# Patient Record
Sex: MALE | Race: WHITE | URBAN - METROPOLITAN AREA
[De-identification: names, ages, dates, MRNs, and addresses within clinical notes are randomized per-mention and may not be internally consistent; named-entity substitution may affect disease eponyms.]

---

## 2017-01-04 ENCOUNTER — HOSPITAL ENCOUNTER (EMERGENCY)
Facility: CLINIC | Age: 37
Discharge: HOME | End: 2017-01-04
Attending: EMERGENCY MEDICINE | Admitting: EMERGENCY MEDICINE
Payer: COMMERCIAL

## 2017-01-04 VITALS
DIASTOLIC BLOOD PRESSURE: 81 MMHG | WEIGHT: 170 LBS | SYSTOLIC BLOOD PRESSURE: 134 MMHG | HEART RATE: 98 BPM | OXYGEN SATURATION: 95 % | BODY MASS INDEX: 25.18 KG/M2 | RESPIRATION RATE: 16 BRPM | TEMPERATURE: 98 F | HEIGHT: 69 IN

## 2017-01-04 DIAGNOSIS — T78.2XXA ANAPHYLAXIS, INITIAL ENCOUNTER: ICD-10-CM

## 2017-01-04 PROCEDURE — 96372 THER/PROPH/DIAG INJ SC/IM: CPT

## 2017-01-04 PROCEDURE — 96374 THER/PROPH/DIAG INJ IV PUSH: CPT

## 2017-01-04 PROCEDURE — S0028 INJECTION, FAMOTIDINE, 20 MG: HCPCS | Performed by: EMERGENCY MEDICINE

## 2017-01-04 PROCEDURE — 96375 TX/PRO/DX INJ NEW DRUG ADDON: CPT

## 2017-01-04 PROCEDURE — 25000125 ZZHC RX 250: Performed by: EMERGENCY MEDICINE

## 2017-01-04 PROCEDURE — 99284 EMERGENCY DEPT VISIT MOD MDM: CPT | Mod: 25

## 2017-01-04 RX ORDER — METHYLPREDNISOLONE SODIUM SUCCINATE 125 MG/2ML
125 INJECTION, POWDER, LYOPHILIZED, FOR SOLUTION INTRAMUSCULAR; INTRAVENOUS ONCE
Status: COMPLETED | OUTPATIENT
Start: 2017-01-04 | End: 2017-01-04

## 2017-01-04 RX ORDER — EPINEPHRINE 0.3 MG/.3ML
0.3 INJECTION SUBCUTANEOUS
Qty: 0.6 ML | Refills: 1 | Status: SHIPPED | OUTPATIENT
Start: 2017-01-04

## 2017-01-04 RX ORDER — DIPHENHYDRAMINE HYDROCHLORIDE 50 MG/ML
INJECTION INTRAMUSCULAR; INTRAVENOUS
Status: DISCONTINUED
Start: 2017-01-04 | End: 2017-01-04 | Stop reason: HOSPADM

## 2017-01-04 RX ORDER — PREDNISONE 20 MG/1
40 TABLET ORAL DAILY
Qty: 10 TABLET | Refills: 0 | Status: SHIPPED | OUTPATIENT
Start: 2017-01-04 | End: 2017-01-09

## 2017-01-04 RX ADMIN — EPINEPHRINE 0.3 MG: 1 INJECTION, SOLUTION INTRAMUSCULAR; SUBCUTANEOUS at 18:30

## 2017-01-04 RX ADMIN — METHYLPREDNISOLONE SODIUM SUCCINATE 125 MG: 125 INJECTION, POWDER, FOR SOLUTION INTRAMUSCULAR; INTRAVENOUS at 18:32

## 2017-01-04 RX ADMIN — FAMOTIDINE 20 MG: 10 INJECTION, SOLUTION INTRAVENOUS at 19:05

## 2017-01-04 ASSESSMENT — ENCOUNTER SYMPTOMS
NAUSEA: 0
ABDOMINAL PAIN: 0

## 2017-01-04 NOTE — ED AVS SNAPSHOT
Emergency Department    [address] [place] [address]    Phone:  [phone]    Fax:  [phone]                                       Jonathan Mcdonald   MRN: [id number]    Department:   Emergency Department   Date of Visit:  1/4/2017           Patient Information     Date Of Birth          1980        Your diagnoses for this visit were:     Anaphylaxis, initial encounter        You were seen by Deandre Lanza MD.      Follow-up Information     Please follow up.    Why:  consider allergy testing, use the Epipen if severe recurrent allergy symptoms - start Prednisone tonight, continue Benadryl and Zantac        Discharge Instructions         Anaphylaxis  Anaphylaxis is the term for a severe allergic reaction. It may begin within minutes, up to a couple hours after exposure to the substance you are allergic to ( allergen ). Symptoms include nausea, vomiting, diarrhea or stomach cramps, itchy rash (hives), swelling of the eyes, lips, face or tongue, wheezing, difficulty breathing or swallowing, throat tightness, chest pain, dizziness or fainting.  This kind of reaction can be life-threatening. Fortunately, your case has responded to treatment. Any remaining symptoms should resolve within 6 to 24 hours.  If you are exposed to the same substance again, you may have the same or more severe reaction. Treatment for anaphylaxis is epinephrine (adrenalin). This is available by prescription as Epi-Pen for self-injection. If the cause of your reaction is known, you should avoid exposure in the future. If the cause is not known, follow up with your doctor for special testing to determine what you are allergic to.  Home care    Rest at home for the next 24 hours.    Avoid tobacco and alcohol consumption. These may worsen your symptoms.    If you know what caused your reaction today, avoid that in the future since the next reaction may be worse. Let your family members, friends and personal  physician know about your allergic reaction.    If your allergy was to food, learn how to read food labels so you can check for the offending substance. If a product does not have a label, it is best to avoid it.    Consider carrying an identification card or getting a Medic-Alert  bracelet to inform medical personnel of your condition in the event you are not able to do so yourself.    If an Epi-Pen was prescribed, carry it at all times. It can be life-saving. Learn how to use the device. If you begin to feel the symptoms of another reaction in the future, use the Epi-Pen to inject yourself, and then call 911. Don t wait until symptoms become severe.    Oral Benadryl (diphenhydramine) is an antihistamine available at drug and grocery stores. Unless a prescription antihistamine was given, Benadryl may be used to reduce itching if large areas of the skin are involved. Use lower doses during the daytime and higher doses at bedtime since the drug may make you sleepy. [NOTE: Do not use Benadryl if you have glaucoma or if you are a man with trouble urinating due to an enlarged prostate.] Claritin (loratidine) is an antihistamine that causes less drowsiness and is a good alternative for daytime use.    If you were prescribed any medicines to prevent symptoms from returning, be sure to take them exactly as directed.  Follow-up care  Follow up with your doctor or as advised if you are not improving over the next 1 to 2 days. If you do not know what caused this reaction, skin and blood tests, or an  elimination diet  may be helpful. You may locate an allergy specialist in your area by contacting:    American Academy of Allergy, Asthma & Immunology www.aaaai.org [phone]    American College of Allergy, Asthma & Immunology www.acaai.org  When to seek medical advice  Call your health care provider right away if any of these occur:    Worsening of your symptoms    Trouble breathing or swallowing    Swelling in the mouth or  face    Chest pain    Dizziness, weakness or fainting    1123-8034 [employer]. [address] [place] [address], [place] [address]. All rights reserved. This information is not intended as a substitute for professional medical care. Always follow your healthcare professional's instructions.          24 Hour Appointment Hotline       To make an appointment at any [facility], call [phone] ([phone]). If you don't have a family doctor or clinic, we will help you find one. [facility] are conveniently located to serve the needs of you and your family.             Review of your medicines      START taking        Dose / Directions Last dose taken    EPINEPHrine 0.3 MG/0.3ML injection   Dose:  0.3 mg   Quantity:  0.6 mL        Inject 0.3 mLs (0.3 mg) into the muscle once as needed for anaphylaxis   Refills:  1        predniSONE 20 MG tablet   Commonly known as:  DELTASONE   Dose:  40 mg   Quantity:  10 tablet        Take 2 tablets (40 mg) by mouth daily for 5 days   Refills:  0        ranitidine 150 MG tablet   Commonly known as:  ZANTAC   Dose:  150 mg   Quantity:  10 tablet        Take 1 tablet (150 mg) by mouth 2 times daily for 5 days   Refills:  0                Prescriptions were sent or printed at these locations (3 Prescriptions)                   Other Prescriptions                Printed at Department/Unit printer (3 of 3)         ranitidine (ZANTAC) 150 MG tablet               predniSONE (DELTASONE) 20 MG tablet               EPINEPHrine 0.3 MG/0.3ML injection                Procedures and tests performed during your visit     Peripheral IV: Standard      Orders Needing Specimen Collection     None      Pending Results     No orders found from 1/3/2017 to 1/5/2017.            Pending Culture Results     No orders found from 1/3/2017 to 1/5/2017.             Test Results from your hospital stay            Clinical Quality Measure: Blood Pressure Screening     Your blood pressure was  "checked while you were in the emergency department today. The last reading we obtained was  BP: 139/86 mmHg . Please read the guidelines below about what these numbers mean and what you should do about them.  If your systolic blood pressure (the top number) is less than 120 and your diastolic blood pressure (the bottom number) is less than 80, then your blood pressure is normal. There is nothing more that you need to do about it.  If your systolic blood pressure (the top number) is 120-139 or your diastolic blood pressure (the bottom number) is 80-89, your blood pressure may be higher than it should be. You should have your blood pressure rechecked within a year by a primary care provider.  If your systolic blood pressure (the top number) is 140 or greater or your diastolic blood pressure (the bottom number) is 90 or greater, you may have high blood pressure. High blood pressure is treatable, but if left untreated over time it can put you at risk for heart attack, stroke, or kidney failure. You should have your blood pressure rechecked by a primary care provider within the next 4 weeks.  If your provider in the emergency department today gave you specific instructions to follow-up with your doctor or provider even sooner than that, you should follow that instruction and not wait for up to 4 weeks for your follow-up visit.        Thank you for choosing [facility]       Thank you for choosing [facility] for your care. Our goal is always to provide you with excellent care. Hearing back from our patients is one way we can continue to improve our services. Please take a few minutes to complete the written survey that you may receive in the mail after you visit with us. Thank you!        [employer] Information     [employer] lets you send messages to your doctor, view your test results, renew your prescriptions, schedule appointments and more. To sign up, go to [email or web address] . Click on \"Log in\" on the left side of the " "screen, which will take you to the Welcome page. Then click on \"Sign up Now\" on the right side of the page.     You will be asked to enter the access code listed below, as well as some personal information. Please follow the directions to create your username and password.     Your access code is: [email or web address]  Expires: 2017  8:17 PM     Your access code will  in 90 days. If you need help or a new code, please call your [facility] clinic or [phone].        Care EveryWhere ID     This is your Care EveryWhere ID. This could be used by other organizations to access your [facility] medical records  [id number]        After Visit Summary       This is your record. Keep this with you and show to your community pharmacist(s) and doctor(s) at your next visit.                  "

## 2017-01-04 NOTE — ED AVS SNAPSHOT
Emergency Department    [address] [facility] [address]    Phone:  [phone]    Fax:  [phone]                                       Jonathan Mcdonald   MRN: [id number]    Department:   Emergency Department   Date of Visit:  1/4/2017           After Visit Summary Signature Page     I have received my discharge instructions, and my questions have been answered. I have discussed any challenges I see with this plan with the nurse or doctor.    ..........................................................................................................................................  Patient/Patient Representative Signature      ..........................................................................................................................................  Patient Representative Print Name and Relationship to Patient    ..................................................               ................................................  Date                                            Time    ..........................................................................................................................................  Reviewed by Signature/Title    ...................................................              ..............................................  Date                                                            Time

## 2017-01-05 NOTE — ED PROVIDER NOTES
"  History     Chief Complaint:    Allergic Reaction      HPI   Jonathan Mcdonald is a 36 year old male who presents for evaluation of allergic reaction. The patient drank a triple berry smoothie and 10-15 minutes later had onset of mouth/tongue swelling, erythema to the chest and upper extremities, and itchy skin. He took 2 Benadryl prior to arrival. He denies nausea, chest pain, or abdominal pain. He does not have any known food allergies and has eaten strawberries without allergic reaction in the past. He has previously had a similar reaction after drinking beer containing hibiscus. He is also on Sudafed for cold symptoms. He has not previously seen an allergist.    Allergies:  Sulfa drugs       Medications:    The patient is currently on no regular medications.      Past Medical History:    History reviewed.  No significant past medical history.       Past Surgical History:    History reviewed. No pertinent past surgical history.      Family History:    History reviewed. No pertinent family history.      Social History:  Presents to the ED with mother     Review of Systems   Cardiovascular: Negative for chest pain.   Gastrointestinal: Negative for nausea and abdominal pain.   All other systems reviewed and are negative.      Physical Exam   First Vitals:  BP: 139/86 mmHg  Temp: 98  F (36.7  C)  Temp src: Oral  Resp: 18  SpO2: 97 %  Height: 175.3 cm (5' 9\")  Weight: 77.111 kg (170 lb)        Physical Exam   SKIN:  Warm, dry.  Diffuse erythematous urticaria over the upper torso and BUE.  HEMATOLOGIC/IMMUNOLOGIC/LYMPHATIC:  No pallor.  No limb edema.  HENT:  Moist oral mucosa.  No angioedema.  Normal phonation.  No stridor.  EYES:  Conjunctivae normal.  CARDIOVASCULAR:  Regular rate and rhythm.  RESPIRATORY:  No respiratory distress, breath sounds equal and normal.  GASTROINTESTINAL:  Soft, nontender abdomen.  NEUROLOGIC:  Alert, conversant.  PSYCHIATRIC:  Normal mood.    Emergency Department Course "     Interventions:  (1830) Epinephrine 0.3 mg IM  (1832) Solumedrol 125 mg IV  (1905) Pepcid 20 mg IV    Emergency Department Course:  Nursing notes and vitals reviewed.  I performed an exam of the patient as documented above.   Findings and plan explained to the patient. Patient discharged home with instructions regarding supportive care, medications, and reasons to return. The importance of close follow-up was reviewed. The patient was prescribed Zantac, Prednisone, and Epipen.     Impression & Plan      Medical Decision Making:  This patient presents with anaphylaxis and responded quite well to treatment, well enough to go home. I prescribed the typical medications and advised return with severe rebounding symptoms especially requiring auto-injection of epinephrine.     Diagnosis:    ICD-10-CM    1. Anaphylaxis, initial encounter T78.2XXA        Disposition:  Discharge to home.     Discharge Medications:  New Prescriptions    EPINEPHRINE 0.3 MG/0.3ML INJECTION    Inject 0.3 mLs (0.3 mg) into the muscle once as needed for anaphylaxis    PREDNISONE (DELTASONE) 20 MG TABLET    Take 2 tablets (40 mg) by mouth daily for 5 days    RANITIDINE (ZANTAC) 150 MG TABLET    Take 1 tablet (150 mg) by mouth 2 times daily for 5 days         Rosalind Saul  1/4/2017    EMERGENCY DEPARTMENT    I, Rosalind Saul, am serving as a scribe on 1/4/2017 at 6:13 PM to personally document services performed by Dr. Lanza based on my observations and the provider's statements to me.      Deandre Lanza MD  01/06/17 1124

## 2017-01-05 NOTE — DISCHARGE INSTRUCTIONS
Anaphylaxis  Anaphylaxis is the term for a severe allergic reaction. It may begin within minutes, up to a couple hours after exposure to the substance you are allergic to ( allergen ). Symptoms include nausea, vomiting, diarrhea or stomach cramps, itchy rash (hives), swelling of the eyes, lips, face or tongue, wheezing, difficulty breathing or swallowing, throat tightness, chest pain, dizziness or fainting.  This kind of reaction can be life-threatening. Fortunately, your case has responded to treatment. Any remaining symptoms should resolve within 6 to 24 hours.  If you are exposed to the same substance again, you may have the same or more severe reaction. Treatment for anaphylaxis is epinephrine (adrenalin). This is available by prescription as Epi-Pen for self-injection. If the cause of your reaction is known, you should avoid exposure in the future. If the cause is not known, follow up with your doctor for special testing to determine what you are allergic to.  Home care    Rest at home for the next 24 hours.    Avoid tobacco and alcohol consumption. These may worsen your symptoms.    If you know what caused your reaction today, avoid that in the future since the next reaction may be worse. Let your family members, friends and personal physician know about your allergic reaction.    If your allergy was to food, learn how to read food labels so you can check for the offending substance. If a product does not have a label, it is best to avoid it.    Consider carrying an identification card or getting a Medic-Alert  bracelet to inform medical personnel of your condition in the event you are not able to do so yourself.    If an Epi-Pen was prescribed, carry it at all times. It can be life-saving. Learn how to use the device. If you begin to feel the symptoms of another reaction in the future, use the Epi-Pen to inject yourself, and then call 911. Don t wait until symptoms become severe.    Oral Benadryl  (diphenhydramine) is an antihistamine available at drug and grocery stores. Unless a prescription antihistamine was given, Benadryl may be used to reduce itching if large areas of the skin are involved. Use lower doses during the daytime and higher doses at bedtime since the drug may make you sleepy. [NOTE: Do not use Benadryl if you have glaucoma or if you are a man with trouble urinating due to an enlarged prostate.] Claritin (loratidine) is an antihistamine that causes less drowsiness and is a good alternative for daytime use.    If you were prescribed any medicines to prevent symptoms from returning, be sure to take them exactly as directed.  Follow-up care  Follow up with your doctor or as advised if you are not improving over the next 1 to 2 days. If you do not know what caused this reaction, skin and blood tests, or an  elimination diet  may be helpful. You may locate an allergy specialist in your area by contacting:    American Academy of Allergy, Asthma & Immunology www.aaaai.org [phone]    American College of Allergy, Asthma & Immunology www.acaai.org  When to seek medical advice  Call your health care provider right away if any of these occur:    Worsening of your symptoms    Trouble breathing or swallowing    Swelling in the mouth or face    Chest pain    Dizziness, weakness or fainting    5530-6427 [employer]. [address] [place] [address], [place] [address]. All rights reserved. This information is not intended as a substitute for professional medical care. Always follow your healthcare professional's instructions.